# Patient Record
Sex: MALE | NOT HISPANIC OR LATINO | Employment: OTHER | URBAN - METROPOLITAN AREA
[De-identification: names, ages, dates, MRNs, and addresses within clinical notes are randomized per-mention and may not be internally consistent; named-entity substitution may affect disease eponyms.]

---

## 2023-05-17 ENCOUNTER — TELEPHONE (OUTPATIENT)
Dept: NURSING | Facility: CLINIC | Age: 68
End: 2023-05-17

## 2023-05-17 NOTE — TELEPHONE ENCOUNTER
Patient called stating he is considering transferring care from [facility] to [facility]. He has a code that he was given by [facility] for [facility] to access his Care Everywhere. Patient requests we access his records and confirm we were layla to access them.   Code: [id number]    Pt call back is [phone]

## 2023-05-17 NOTE — TELEPHONE ENCOUNTER
Spoke with patient to let him know that once he is checked in for appointment we will have access to Care Everywhere. May need consent form signed depending on organization.  Phoebe Mccray,

## 2023-06-01 ENCOUNTER — HEALTH MAINTENANCE LETTER (OUTPATIENT)
Age: 68
End: 2023-06-01